# Patient Record
Sex: FEMALE | Race: WHITE
[De-identification: names, ages, dates, MRNs, and addresses within clinical notes are randomized per-mention and may not be internally consistent; named-entity substitution may affect disease eponyms.]

---

## 2020-12-28 ENCOUNTER — HOSPITAL ENCOUNTER (OUTPATIENT)
Dept: HOSPITAL 62 - RDC | Age: 35
End: 2020-12-28
Attending: NURSE PRACTITIONER
Payer: OTHER GOVERNMENT

## 2020-12-28 VITALS — DIASTOLIC BLOOD PRESSURE: 73 MMHG | SYSTOLIC BLOOD PRESSURE: 121 MMHG

## 2020-12-28 DIAGNOSIS — J02.9: ICD-10-CM

## 2020-12-28 DIAGNOSIS — Z20.828: Primary | ICD-10-CM

## 2020-12-28 DIAGNOSIS — R09.89: ICD-10-CM

## 2020-12-28 DIAGNOSIS — R11.0: ICD-10-CM

## 2020-12-28 DIAGNOSIS — R05: ICD-10-CM

## 2020-12-28 LAB
A TYPE INFLUENZA AG: NEGATIVE
B INFLUENZA AG: NEGATIVE

## 2020-12-28 PROCEDURE — 99201: CPT

## 2020-12-28 PROCEDURE — 99211 OFF/OP EST MAY X REQ PHY/QHP: CPT

## 2020-12-28 PROCEDURE — C9803 HOPD COVID-19 SPEC COLLECT: HCPCS

## 2020-12-28 PROCEDURE — 87070 CULTURE OTHR SPECIMN AEROBIC: CPT

## 2020-12-28 PROCEDURE — 87635 SARS-COV-2 COVID-19 AMP PRB: CPT

## 2020-12-28 PROCEDURE — 87804 INFLUENZA ASSAY W/OPTIC: CPT

## 2020-12-28 PROCEDURE — 87880 STREP A ASSAY W/OPTIC: CPT

## 2020-12-28 NOTE — ER RDC ASSESSMENT REPORT
Intake





- In the Last 14 days


Have you traveled outside [place]?: No


Have you been in close contact with someone CONFIRMED: Yes


Worked in Healthcare?: No





- Symptoms


Subjective Fever(Felt feverish): No


Chills: No


Muscule Aches: No


Runny Nose: Yes


Sore Throat: Yes


Cough (New or worsening chronic cough): Yes


Shortness of breath: No


Nausea or Vomiting: Yes


Headache: No


Abdominal Pain: No


Diarrhea(3 or more loose stools in last 24 hours): No





- Do you have any of the following


Chronic lung disease: Asthma or emphysema or COPD: No


Cystic Fibrosis: No


Diabetes: No


High Blood Pressure: No


Cardiovascular Disease: No


Chronic Kidney Disease: No


Chronic Liver Disease: No


Chronic blood disorder like Sickle Cell Disease: No


Weak immune system due to disease or medication: No


Neurologic condition that limits movement: No


Developmental delay - Moderate to Severe: No


Recent (within past 2 weeks) or current Pregnancy: No


Morbid Obesity (>100 pounds over ideal weight): No


Obesity Comment: 





Height 5 feet 4 inches weight 125 pounds





- Objective


Temperature: 98.6 F


Pulse Rate: 74


Respiratory Rate: 16


Blood Pressure: 121/73


O2 Sat by Pulse Oximetry: 99


Objective: 


Given above, testing performed: 
































If Testing Performed:


Test Specimen Type Sent to











General





- General


Information source: Patient


Notes: 





Patient here at [facility] for Covid testing patient reports had exposure to a neighbor

is being tested for Covid patient's  was near the patient is well and he 

is being tested.  Patient started to have symptoms 2 days ago which included 

runny nose sore throat cough and nausea.  Patient sees provider Adalberto at [facility] and will follow up with them.





Past Medical History





- General


Information source: Patient





- Social History


Smoking Status: Never Smoker





Physical Exam





- General


General appearance: Appears well, Alert


In distress: None


Notes: 





PHYSICAL EXAMINATION: 


GENERAL: Well-appearing and in no acute distress. 


HEAD: Atraumatic, normocephalic. 


EYES: sclera anicteric, conjunctiva are normal. 


ENT: nares patent. Moist mucous membranes. 


NECK: Normal range of motion, supple without lymphadenopathy 


LUNGS: CTAB and equal. No wheezes rales or rhonchi.  Respirations even and 

unlabored lung sounds clear.


HEART: Regular rate and rhythm without murmurs 


ABDOMEN: Soft, nontender, normal bowel sounds, no guarding. 


EXTREMITIES: Normal range of motion, no pitting edema. No cyanosis. 


NEUROLOGICAL: Cranial nerves grossly intact. Normal speech. Normal gait.


PSYCH: Normal mood, normal affect. 


SKIN: Warm, Dry, normal turgor, no rashes or lesions noted








Diagnostic Results


Laboratory Results: 


Pending strep culture.  Pending Covid testing results.  Patient provided 

instructions regarding Covid to include: As a person under investigation for 

Covid 19, the [facility] of Health and Human Services, division 

of public health advises you to adhere to the following guidance until your test

results are reported to you.  If your test result is positive, you will receive 

additional information from your provider and your local health department at 

that time.


 


Remain at home until you are cleared by the health provider or public health 

authorities.


 


Keep a log of visitors to your home, notify any visitors to your home of your 

isolation status.


 


If you plan to move to a new address or leave the [facility], notify the local 

health department in your [facility].


 


Call your doctor or seek care if you have an urgent medical need.  Before 

seeking medical care, call ahead to get instructions from the provider before 

arriving at the medical office clinic or hospital.  Notify them that you are 

being tested for the virus that causes Covid 19 so that arrangements can be 

made, as necessary, to prevent transmission to others in the healthcare setting.

 Next, notify the local health department in your [facility].


 


If a medical emergency arises and you need to call 911, inform the first 

responders that you are being tested for the virus that causes Covid 19.  Next, 

notify the local health department in your [facility].





Patient Education/Counseling


Counseling/Education: 





Patient presents with upper respiratory symptoms worrisome for possible Covid 

19.  Patient does not have emergency worring symptoms such as difficulty 

breathing, shortness of breath, chest pain, pressure, confusion or cyanosis.  

Patient appears suitable for discharge.  Patient instructed to follow-up with 

her PCP at [facility]  To ED for persistent or worsening symptoms.  

Patient's vital signs are stable and patient is nontoxic in appearance.  Good 

return precautions have been discussed with patient, patient verbalized 

understanding and is agreeable with discharge plan of care at this time.





C Discharge





- Discharge


Condition: Stable


Disposition: Home; Selfcare

## 2020-12-30 ENCOUNTER — HOSPITAL ENCOUNTER (OUTPATIENT)
Dept: HOSPITAL 62 - EMPHEALTH | Age: 35
End: 2020-12-30
Attending: INTERNAL MEDICINE
Payer: OTHER GOVERNMENT

## 2020-12-30 DIAGNOSIS — Z23: Primary | ICD-10-CM

## 2020-12-30 PROCEDURE — 91300: CPT

## 2021-01-20 ENCOUNTER — HOSPITAL ENCOUNTER (OUTPATIENT)
Dept: HOSPITAL 62 - EMPHEALTH | Age: 36
End: 2021-01-20
Attending: INTERNAL MEDICINE
Payer: OTHER GOVERNMENT

## 2021-01-20 DIAGNOSIS — Z23: Primary | ICD-10-CM

## 2021-01-20 PROCEDURE — 91300: CPT
